# Patient Record
Sex: FEMALE | Race: WHITE | NOT HISPANIC OR LATINO | Employment: OTHER | URBAN - METROPOLITAN AREA
[De-identification: names, ages, dates, MRNs, and addresses within clinical notes are randomized per-mention and may not be internally consistent; named-entity substitution may affect disease eponyms.]

---

## 2018-01-17 ENCOUNTER — HOSPITAL ENCOUNTER (OUTPATIENT)
Facility: HOSPITAL | Age: 83
Setting detail: OBSERVATION
Discharge: NURSING, REHAB OR LONG-TERM CARE | End: 2018-01-19
Attending: EMERGENCY MEDICINE | Admitting: INTERNAL MEDICINE

## 2018-01-17 ENCOUNTER — APPOINTMENT (OUTPATIENT)
Dept: GENERAL RADIOLOGY | Facility: HOSPITAL | Age: 83
End: 2018-01-17

## 2018-01-17 DIAGNOSIS — S80.01XA CONTUSION OF RIGHT KNEE, INITIAL ENCOUNTER: ICD-10-CM

## 2018-01-17 DIAGNOSIS — Z74.09 IMMOBILITY: Primary | ICD-10-CM

## 2018-01-17 DIAGNOSIS — R53.1 GENERALIZED WEAKNESS: ICD-10-CM

## 2018-01-17 DIAGNOSIS — E87.6 HYPOKALEMIA: ICD-10-CM

## 2018-01-17 DIAGNOSIS — Z74.09 IMPAIRED FUNCTIONAL MOBILITY AND ACTIVITY TOLERANCE: ICD-10-CM

## 2018-01-17 LAB
BASOPHILS # BLD AUTO: 0.04 10*3/MM3 (ref 0–0.2)
BASOPHILS NFR BLD AUTO: 0.6 % (ref 0–1.5)
DEPRECATED RDW RBC AUTO: 53.1 FL (ref 37–54)
EOSINOPHIL # BLD AUTO: 0.32 10*3/MM3 (ref 0–0.7)
EOSINOPHIL NFR BLD AUTO: 4.6 % (ref 0.3–6.2)
ERYTHROCYTE [DISTWIDTH] IN BLOOD BY AUTOMATED COUNT: 16.2 % (ref 11.7–13)
HCT VFR BLD AUTO: 35.1 % (ref 35.6–45.5)
HGB BLD-MCNC: 11 G/DL (ref 11.9–15.5)
IMM GRANULOCYTES # BLD: 0 10*3/MM3 (ref 0–0.03)
IMM GRANULOCYTES NFR BLD: 0 % (ref 0–0.5)
LYMPHOCYTES # BLD AUTO: 1.71 10*3/MM3 (ref 0.9–4.8)
LYMPHOCYTES NFR BLD AUTO: 24.5 % (ref 19.6–45.3)
MCH RBC QN AUTO: 28.2 PG (ref 26.9–32)
MCHC RBC AUTO-ENTMCNC: 31.3 G/DL (ref 32.4–36.3)
MCV RBC AUTO: 90 FL (ref 80.5–98.2)
MONOCYTES # BLD AUTO: 1.01 10*3/MM3 (ref 0.2–1.2)
MONOCYTES NFR BLD AUTO: 14.4 % (ref 5–12)
NEUTROPHILS # BLD AUTO: 3.91 10*3/MM3 (ref 1.9–8.1)
NEUTROPHILS NFR BLD AUTO: 55.9 % (ref 42.7–76)
PLATELET # BLD AUTO: 210 10*3/MM3 (ref 140–500)
PMV BLD AUTO: 10.8 FL (ref 6–12)
RBC # BLD AUTO: 3.9 10*6/MM3 (ref 3.9–5.2)
WBC NRBC COR # BLD: 6.99 10*3/MM3 (ref 4.5–10.7)

## 2018-01-17 PROCEDURE — 99285 EMERGENCY DEPT VISIT HI MDM: CPT

## 2018-01-17 PROCEDURE — 81001 URINALYSIS AUTO W/SCOPE: CPT | Performed by: EMERGENCY MEDICINE

## 2018-01-17 PROCEDURE — 85025 COMPLETE CBC W/AUTO DIFF WBC: CPT | Performed by: EMERGENCY MEDICINE

## 2018-01-17 PROCEDURE — 84484 ASSAY OF TROPONIN QUANT: CPT | Performed by: EMERGENCY MEDICINE

## 2018-01-17 PROCEDURE — 72170 X-RAY EXAM OF PELVIS: CPT

## 2018-01-17 PROCEDURE — 84443 ASSAY THYROID STIM HORMONE: CPT | Performed by: HOSPITALIST

## 2018-01-17 PROCEDURE — 73562 X-RAY EXAM OF KNEE 3: CPT

## 2018-01-17 PROCEDURE — 80053 COMPREHEN METABOLIC PANEL: CPT | Performed by: EMERGENCY MEDICINE

## 2018-01-17 RX ORDER — ESCITALOPRAM OXALATE 10 MG/1
TABLET ORAL DAILY
COMMUNITY

## 2018-01-17 RX ORDER — LISINOPRIL 10 MG/1
TABLET ORAL DAILY
COMMUNITY

## 2018-01-17 RX ORDER — AMLODIPINE BESYLATE 10 MG/1
TABLET ORAL DAILY
COMMUNITY

## 2018-01-17 RX ORDER — SODIUM CHLORIDE 0.9 % (FLUSH) 0.9 %
10 SYRINGE (ML) INJECTION AS NEEDED
Status: DISCONTINUED | OUTPATIENT
Start: 2018-01-17 | End: 2018-01-20 | Stop reason: HOSPADM

## 2018-01-18 ENCOUNTER — APPOINTMENT (OUTPATIENT)
Dept: GENERAL RADIOLOGY | Facility: HOSPITAL | Age: 83
End: 2018-01-18

## 2018-01-18 PROBLEM — S22.31XA RIGHT RIB FRACTURE: Status: ACTIVE | Noted: 2018-01-18

## 2018-01-18 PROBLEM — M19.90 ARTHRITIS: Status: ACTIVE | Noted: 2018-01-18

## 2018-01-18 PROBLEM — S80.01XA CONTUSION OF RIGHT KNEE: Status: ACTIVE | Noted: 2018-01-18

## 2018-01-18 PROBLEM — H91.90 DECREASED HEARING: Status: ACTIVE | Noted: 2018-01-18
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
## 2018-01-18 PROBLEM — M41.9 SCOLIOSIS: Status: ACTIVE | Noted: 2018-01-18

## 2018-01-18 PROBLEM — Z74.09 IMMOBILITY: Status: ACTIVE | Noted: 2018-01-18

## 2018-01-18 PROBLEM — I10 HTN (HYPERTENSION): Status: ACTIVE | Noted: 2018-01-18

## 2018-01-18 LAB
ALBUMIN SERPL-MCNC: 3.3 G/DL (ref 3.5–5.2)
ALBUMIN/GLOB SERPL: 0.9 G/DL
ALP SERPL-CCNC: 52 U/L (ref 39–117)
ALT SERPL W P-5'-P-CCNC: 24 U/L (ref 1–33)
ANION GAP SERPL CALCULATED.3IONS-SCNC: 10.1 MMOL/L
ANION GAP SERPL CALCULATED.3IONS-SCNC: 9.9 MMOL/L
AST SERPL-CCNC: 29 U/L (ref 1–32)
BACTERIA UR QL AUTO: ABNORMAL /HPF
BILIRUB SERPL-MCNC: 0.8 MG/DL (ref 0.1–1.2)
BILIRUB UR QL STRIP: NEGATIVE
BUN BLD-MCNC: 12 MG/DL (ref 8–23)
BUN BLD-MCNC: 13 MG/DL (ref 8–23)
BUN/CREAT SERPL: 23.1 (ref 7–25)
BUN/CREAT SERPL: 24.1 (ref 7–25)
CALCIUM SPEC-SCNC: 8.9 MG/DL (ref 8.2–9.6)
CALCIUM SPEC-SCNC: 9.4 MG/DL (ref 8.2–9.6)
CHLORIDE SERPL-SCNC: 102 MMOL/L (ref 98–107)
CHLORIDE SERPL-SCNC: 103 MMOL/L (ref 98–107)
CLARITY UR: CLEAR
CO2 SERPL-SCNC: 27.1 MMOL/L (ref 22–29)
CO2 SERPL-SCNC: 30.9 MMOL/L (ref 22–29)
COLOR UR: YELLOW
CREAT BLD-MCNC: 0.52 MG/DL (ref 0.57–1)
CREAT BLD-MCNC: 0.54 MG/DL (ref 0.57–1)
GFR SERPL CREATININE-BSD FRML MDRD: 106 ML/MIN/1.73
GFR SERPL CREATININE-BSD FRML MDRD: 111 ML/MIN/1.73
GLOBULIN UR ELPH-MCNC: 3.5 GM/DL
GLUCOSE BLD-MCNC: 98 MG/DL (ref 65–99)
GLUCOSE BLD-MCNC: 98 MG/DL (ref 65–99)
GLUCOSE UR STRIP-MCNC: NEGATIVE MG/DL
HGB UR QL STRIP.AUTO: ABNORMAL
HYALINE CASTS UR QL AUTO: ABNORMAL /LPF
KETONES UR QL STRIP: NEGATIVE
LEUKOCYTE ESTERASE UR QL STRIP.AUTO: NEGATIVE
MAGNESIUM SERPL-MCNC: 2 MG/DL (ref 1.6–2.4)
NITRITE UR QL STRIP: NEGATIVE
PH UR STRIP.AUTO: 6.5 [PH] (ref 5–8)
POTASSIUM BLD-SCNC: 3.1 MMOL/L (ref 3.5–5.2)
POTASSIUM BLD-SCNC: 3.8 MMOL/L (ref 3.5–5.2)
PROT SERPL-MCNC: 6.8 G/DL (ref 6–8.5)
PROT UR QL STRIP: NEGATIVE
RBC # UR: ABNORMAL /HPF
REF LAB TEST METHOD: ABNORMAL
SODIUM BLD-SCNC: 140 MMOL/L (ref 136–145)
SODIUM BLD-SCNC: 143 MMOL/L (ref 136–145)
SP GR UR STRIP: 1.01 (ref 1–1.03)
SQUAMOUS #/AREA URNS HPF: ABNORMAL /HPF
TRANS CELLS #/AREA URNS HPF: ABNORMAL /HPF
TROPONIN T SERPL-MCNC: <0.01 NG/ML (ref 0–0.03)
TSH SERPL DL<=0.05 MIU/L-ACNC: 0.95 MIU/ML (ref 0.27–4.2)
UROBILINOGEN UR QL STRIP: ABNORMAL
WBC UR QL AUTO: ABNORMAL /HPF

## 2018-01-18 PROCEDURE — 97162 PT EVAL MOD COMPLEX 30 MIN: CPT

## 2018-01-18 PROCEDURE — G0378 HOSPITAL OBSERVATION PER HR: HCPCS

## 2018-01-18 PROCEDURE — 83735 ASSAY OF MAGNESIUM: CPT | Performed by: HOSPITALIST

## 2018-01-18 PROCEDURE — 36415 COLL VENOUS BLD VENIPUNCTURE: CPT | Performed by: INTERNAL MEDICINE

## 2018-01-18 PROCEDURE — 94799 UNLISTED PULMONARY SVC/PX: CPT

## 2018-01-18 PROCEDURE — 97110 THERAPEUTIC EXERCISES: CPT

## 2018-01-18 PROCEDURE — 25010000002 ENOXAPARIN PER 10 MG: Performed by: HOSPITALIST

## 2018-01-18 PROCEDURE — G8978 MOBILITY CURRENT STATUS: HCPCS

## 2018-01-18 PROCEDURE — G8979 MOBILITY GOAL STATUS: HCPCS

## 2018-01-18 PROCEDURE — 80048 BASIC METABOLIC PNL TOTAL CA: CPT | Performed by: INTERNAL MEDICINE

## 2018-01-18 PROCEDURE — 96372 THER/PROPH/DIAG INJ SC/IM: CPT

## 2018-01-18 PROCEDURE — 71045 X-RAY EXAM CHEST 1 VIEW: CPT

## 2018-01-18 RX ORDER — AMLODIPINE BESYLATE 10 MG/1
10 TABLET ORAL DAILY
Status: DISCONTINUED | OUTPATIENT
Start: 2018-01-18 | End: 2018-01-20 | Stop reason: HOSPADM

## 2018-01-18 RX ORDER — ACETAMINOPHEN 500 MG
1000 TABLET ORAL ONCE
Status: DISCONTINUED | OUTPATIENT
Start: 2018-01-18 | End: 2018-01-18

## 2018-01-18 RX ORDER — ACETAMINOPHEN 325 MG/1
650 TABLET ORAL EVERY 6 HOURS PRN
Status: DISCONTINUED | OUTPATIENT
Start: 2018-01-18 | End: 2018-01-20 | Stop reason: HOSPADM

## 2018-01-18 RX ORDER — TRAMADOL HYDROCHLORIDE 50 MG/1
50 TABLET ORAL EVERY 4 HOURS PRN
Status: DISCONTINUED | OUTPATIENT
Start: 2018-01-18 | End: 2018-01-20 | Stop reason: HOSPADM

## 2018-01-18 RX ORDER — SODIUM CHLORIDE 0.9 % (FLUSH) 0.9 %
1-10 SYRINGE (ML) INJECTION AS NEEDED
Status: DISCONTINUED | OUTPATIENT
Start: 2018-01-18 | End: 2018-01-20 | Stop reason: HOSPADM

## 2018-01-18 RX ORDER — ONDANSETRON 2 MG/ML
4 INJECTION INTRAMUSCULAR; INTRAVENOUS EVERY 6 HOURS PRN
Status: DISCONTINUED | OUTPATIENT
Start: 2018-01-18 | End: 2018-01-20 | Stop reason: HOSPADM

## 2018-01-18 RX ORDER — LISINOPRIL 10 MG/1
10 TABLET ORAL DAILY
Status: DISCONTINUED | OUTPATIENT
Start: 2018-01-18 | End: 2018-01-20 | Stop reason: HOSPADM

## 2018-01-18 RX ORDER — POTASSIUM CHLORIDE 7.45 MG/ML
10 INJECTION INTRAVENOUS
Status: DISCONTINUED | OUTPATIENT
Start: 2018-01-18 | End: 2018-01-20 | Stop reason: HOSPADM

## 2018-01-18 RX ORDER — ACETAMINOPHEN 325 MG/1
650 TABLET ORAL EVERY 4 HOURS PRN
Status: DISCONTINUED | OUTPATIENT
Start: 2018-01-18 | End: 2018-01-20 | Stop reason: HOSPADM

## 2018-01-18 RX ORDER — POTASSIUM CHLORIDE 750 MG/1
40 CAPSULE, EXTENDED RELEASE ORAL AS NEEDED
Status: DISCONTINUED | OUTPATIENT
Start: 2018-01-18 | End: 2018-01-20 | Stop reason: HOSPADM

## 2018-01-18 RX ORDER — ESCITALOPRAM OXALATE 10 MG/1
10 TABLET ORAL DAILY
Status: DISCONTINUED | OUTPATIENT
Start: 2018-01-18 | End: 2018-01-20 | Stop reason: HOSPADM

## 2018-01-18 RX ORDER — POTASSIUM CHLORIDE 1.5 G/1.77G
40 POWDER, FOR SOLUTION ORAL AS NEEDED
Status: DISCONTINUED | OUTPATIENT
Start: 2018-01-18 | End: 2018-01-20 | Stop reason: HOSPADM

## 2018-01-18 RX ORDER — TRAMADOL HYDROCHLORIDE 50 MG/1
100 TABLET ORAL EVERY 4 HOURS PRN
Status: DISCONTINUED | OUTPATIENT
Start: 2018-01-18 | End: 2018-01-20 | Stop reason: HOSPADM

## 2018-01-18 RX ADMIN — POTASSIUM CHLORIDE 40 MEQ: 1.5 POWDER, FOR SOLUTION ORAL at 07:52

## 2018-01-18 RX ADMIN — TRAMADOL HYDROCHLORIDE 50 MG: 50 TABLET, FILM COATED ORAL at 12:42

## 2018-01-18 RX ADMIN — ACETAMINOPHEN 650 MG: 325 TABLET ORAL at 07:23

## 2018-01-18 RX ADMIN — POTASSIUM CHLORIDE 40 MEQ: 750 CAPSULE, EXTENDED RELEASE ORAL at 12:42

## 2018-01-18 RX ADMIN — AMLODIPINE BESYLATE 10 MG: 10 TABLET ORAL at 14:06

## 2018-01-18 RX ADMIN — ESCITALOPRAM 10 MG: 10 TABLET, FILM COATED ORAL at 14:07

## 2018-01-18 RX ADMIN — TRAMADOL HYDROCHLORIDE 50 MG: 50 TABLET, FILM COATED ORAL at 21:29

## 2018-01-18 RX ADMIN — ENOXAPARIN SODIUM 40 MG: 40 INJECTION SUBCUTANEOUS at 07:22

## 2018-01-18 RX ADMIN — LISINOPRIL 10 MG: 10 TABLET ORAL at 14:06

## 2018-01-18 NOTE — PLAN OF CARE
Problem: Patient Care Overview (Adult)  Goal: Plan of Care Review  Outcome: Ongoing (interventions implemented as appropriate)   01/18/18 1415   Coping/Psychosocial Response Interventions   Plan Of Care Reviewed With patient   Outcome Evaluation   Outcome Summary/Follow up Plan pt presents with significant pain limiting all activity. SHe required max a x 2 to sit edge of bed, and was unable to tolerate standing or walking. she will benefit from PT to address, per pt , she was walking independently in her home prior to this injury, I think pt has a good chance to return to this however she needs time to heal and for pain contol, she willbenefit from PT to work on mobility, rec snf at d/c       Problem: Inpatient Physical Therapy  Goal: Bed Mobility Goal LTG- PT  Outcome: Ongoing (interventions implemented as appropriate)   01/18/18 1415   Bed Mobility PT LTG   Bed Mobility PT LTG, Date Established 01/18/18   Bed Mobility PT LTG, Time to Achieve 1 wk   Bed Mobility PT LTG, [place] Level moderate assist (50% patient effort);2 person assist required     Goal: Transfer Training Goal 1 LTG- PT  Outcome: Ongoing (interventions implemented as appropriate)   01/18/18 1415   Transfer Training PT LTG   Transfer Training PT LTG, Date Established 01/18/18   Transfer Training PT LTG, Time to Achieve 1 wk   Transfer Training PT LTG, Activity Type sit to stand/stand to sit   Transfer Training PT LTG, [place] Level moderate assist (50% patient effort);2 person assist required     Goal: Gait Training Goal LTG- PT  Outcome: Ongoing (interventions implemented as appropriate)   01/18/18 1415   Gait Training PT LTG   Gait Training Goal PT LTG, Date Established 01/18/18   Gait Training Goal PT LTG, Time to Achieve 1 wk   Gait Training Goal PT LTG, [place] Level moderate assist (50% patient effort);2 person assist required   Gait Training Goal PT LTG, Assist Device walker, rolling   Gait Training Goal PT LTG, Distance to  Achieve 10 ft

## 2018-01-18 NOTE — PROGRESS NOTES
Discharge Planning Assessment  [facility]     Patient Name: Vale Campos  MRN: [id number]  Today's Date: 1/18/2018    Admit Date: 1/17/2018          Discharge Needs Assessment       01/18/18 1144    Living Environment    Provides Primary Care For no one, unable/limited ability to care for self    Quality Of Family Relationships supportive;helpful;involved    Discharge Needs Assessment    Concerns To Be Addressed home safety concerns;discharge planning concerns    Equipment Currently Used at Home cane, straight;wheelchair;walker, rolling    Equipment Needed After Discharge oxygen;respiratory supplies    Discharge Disposition home or self-care;still a patient;home healthcare service      01/18/18 1123    Living Environment    Lives With child(raquel), adult    Living Arrangements house   lives with her daughter and son in law    Type of Financial/Environmental Concern none            Discharge Plan       01/18/18 1106    Case Management/Social Work Plan    Additional Comments Spoke with Judy Campos/daughter [phone] and she is agreeable for her mother to go to rehab at a [facility], private pay for therapy. She would like for her mother to go to [facility] first choice and [facility] is her 2nd choice. Placed call to [facility] and she will evaluate and let CCP know if they have a bed today and if they can accept. CCP will continue to follow for any needs that may arise. JAVIER Franco RN, CCP.         Discharge Placement     Facility/Agency Request Status Selected? Address Phone Number Fax Number    [facility] Pending - Request Sent     [address] [address] [phone]    [facility] Pending - Request Sent     [address] [address] [phone]                Demographic Summary       01/18/18 1116    Referral Information    Admission Type observation    Arrived From home or self-care    Referral Source admission  list;physician    Reason For Consult discharge planning    Record Reviewed clinical discipline documentation;history and physical;medical record;patient profile;plan of care    Contact Information    Permission Granted to Share Information With             Functional Status     None            Psychosocial     None            Abuse/Neglect     None            Legal     None            Substance Abuse     None            Patient Forms     None          Ila Franco RN

## 2018-01-18 NOTE — PROGRESS NOTES
Discharge Planning Assessment  [facility]     Patient Name: Vale Campos  MRN: [id number]  Today's Date: 1/18/2018    Admit Date: 1/17/2018          Discharge Needs Assessment     None            Discharge Plan       01/18/18 1106    Case Management/Social Work Plan    Additional Comments Spoke with Judy Campos/daughter [phone] and she is agreeable for her mother to go to rehab at a [facility], private pay for therapy. She would like for her mother to go to [facility] first choice and [facility] is her 2nd choice. Placed call to [facility] and she will evaluate and let CCP know if they have a bed today and if they can accept. CCP will continue to follow for any needs that may arise. JAVIER Franco RN, [facility].         Discharge Placement     Facility/Agency Request Status Selected? Address Phone Number Fax Number    [facility] Pending - Request Sent     [address] [address] [phone]    [facility] Pending - Request Sent     [address] [address] [phone]                Demographic Summary     None            Functional Status     None            Psychosocial     None            Abuse/Neglect     None            Legal     None            Substance Abuse     None            Patient Forms     None          Ila Franco RN

## 2018-01-18 NOTE — PROGRESS NOTES
Discharge Planning Assessment  [facility]     Patient Name: Vale Campos  MRN: [id number]  Today's Date: 1/18/2018    Admit Date: 1/17/2018          Discharge Needs Assessment     None            Discharge Plan       01/18/18 1709    Case Management/Social Work Plan    Plan SNU for short term rehab private pay for therapy. Family will make the decison on which facility tomorrow. JAVIER Franco RN, CCP.     Patient/Family In Agreement With Plan yes    Additional Comments Spoke with Judy/daughter and she states that if her mother will go to rehab then they will make sure the financial piece is covered. Discharged plan is for tomorrow. [facility] does not have a bed available tomorrow. The family will review the list and call CCP in the AM of their choices. of SNU. JAVIER Franco RN, CCP.         Discharge Placement     Facility/Agency Request Status Selected? Address Phone Number Fax Number    [facility] Declined   no beds available     [address] [address] [phone]    [facility] Declined   no beds available     [address] [address] [phone]                Demographic Summary     None            Functional Status       01/18/18 1702    Functional Status Current    Ambulation 3-->assistive equipment and person    Transferring 3-->assistive equipment and person    Toileting 3-->assistive equipment and person    Bathing 3-->assistive equipment and person    Dressing 3-->assistive equipment and person    Eating 3-->assistive equipment and person    Communication 0-->understands/communicates without difficulty    Swallowing (if score 2 or more for any item, consult Rehab Services) 0-->swallows foods/liquids without difficulty    Change in Functional Status Since Onset of Current Illness/Injury yes    Functional Status Prior    Ambulation 2-->assistive person    Transferring 2-->assistive person     Toileting 2-->assistive person    Bathing 2-->assistive person    Dressing 2-->assistive person    Eating 2-->assistive person    Communication 0-->understands/communicates without difficulty    Swallowing 0-->swallows foods/liquids without difficulty    IADL    Medications completely dependent    Meal Preparation completely dependent    Housekeeping completely dependent    Laundry completely dependent    Shopping completely dependent    Oral Care independent    Activity Tolerance    Current Activity Limitations --   pain with any movement    Usual Activity Tolerance fair    Current Activity Tolerance poor    Cognitive/Perceptual/Developmental    Current Mental Status/Cognitive Functioning no deficits noted    Recent Changes in Mental Status/Cognitive Functioning no changes    Developmental Stage (Eriksson's Stages of Development) Stage 8 (65 years-death/Late Adulthood) Integrity vs. 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

## 2018-01-18 NOTE — ED NOTES
Fell sun and was seen at [facility].  Diagnosed with contusions and discharged.  Now complains of severe pain in right knee and unable to get up out of bed.  Lives with son in law and he is unable to get her up OOB.       Javon Siddiqui RN  01/17/18 1948

## 2018-01-18 NOTE — ED PROVIDER NOTES
EMERGENCY DEPARTMENT ENCOUNTER    CHIEF COMPLAINT  Chief Complaint: Fall  History given by: patient   History limited by: n/a  Room Number: 18/18  PMD: Juanita Carvalho MD      HPI:  Pt is a 89 y.o. female who presents after a fall 3 days ago. Pt was seen at [facility] after the fall, and was dx with a right rib fracture. Currently, pt complains of pain in her right hip pain. Pt states that she has difficulty ambulating and getting out of bed because of the fall.     Duration:  3 days   Onset: gradual  Timing: constant   Location: right hip   Radiation: none   Quality: pain   Intensity/Severity: moderate  Progression: unchanged   Associated Symptoms: none  Aggravating Factors: movement  Alleviating Factors: none    PAST MEDICAL HISTORY  Active Ambulatory Problems     Diagnosis Date Noted   • No Active Ambulatory Problems     Resolved Ambulatory Problems     Diagnosis Date Noted   • No Resolved Ambulatory Problems     Past Medical History:   Diagnosis Date   • Arthritis    • Hypertension        PAST SURGICAL HISTORY  Past Surgical History:   Procedure Laterality Date   • APPENDECTOMY     • CHOLECYSTECTOMY         FAMILY HISTORY  History reviewed. No pertinent family history.    SOCIAL HISTORY  Social History     Social History   • Marital status:      Spouse name: N/A   • Number of children: N/A   • Years of education: N/A     Occupational History   • Not on file.     Social History Main Topics   • Smoking status: Former Smoker   • Smokeless tobacco: Never Used   • Alcohol use No   • Drug use: No   • Sexual activity: Defer     Other Topics Concern   • Not on file     Social History Narrative   • No narrative on file       ALLERGIES  Codeine    REVIEW OF SYSTEMS  Review of Systems   Constitutional: Negative for chills and fever.   HENT: Negative for sore throat.    Respiratory: Negative for cough.    Gastrointestinal: Negative for nausea and vomiting.   Genitourinary: Negative for dysuria.   Musculoskeletal:  Positive for arthralgias (Right hip). Negative for back pain.   Psychiatric/Behavioral: The patient is not nervous/anxious.        PHYSICAL EXAM  ED Triage Vitals   Temp Heart Rate Resp BP SpO2   01/17/18 1948 01/17/18 1948 01/17/18 1948 01/17/18 1948 01/17/18 1948   98.6 °F (37 °C) 72 14 175/85 96 %      Temp src Heart Rate Source Patient Position BP Location FiO2 (%)   -- -- -- -- --              Physical Exam   Constitutional: She is oriented to person, place, and time and well-developed, well-nourished, and in no distress. No distress.   HENT:   Head: Normocephalic and atraumatic.   Eyes: EOM are normal. Pupils are equal, round, and reactive to light.   Neck: Normal range of motion. Neck supple.   Cardiovascular: Normal rate, regular rhythm and normal heart sounds.    Pulmonary/Chest: Effort normal and breath sounds normal. No respiratory distress.   Abdominal: Soft. There is no tenderness. There is no rebound and no guarding.   Musculoskeletal: Normal range of motion. She exhibits no edema.   Contusion to the right knee. Full ROM   Neurological: She is alert and oriented to person, place, and time. She has normal sensation and normal strength.   Skin: Skin is warm and dry. No rash noted.   Psychiatric: Mood and affect normal.   Nursing note and vitals reviewed.      LAB RESULTS  Lab Results (last 24 hours)     Procedure Component Value Units Date/Time    CBC & Differential [[id number]] Collected:  01/17/18 2342    Specimen:  Blood Updated:  01/17/18 4557    Narrative:       The following orders were created for panel order CBC & Differential.  Procedure                               Abnormality         Status                     ---------                               -----------         ------                     CBC Auto Differential[[id number]]        Abnormal            Final result                 Please view results for these tests on the individual orders.    Comprehensive Metabolic Panel [[id number]]   (Abnormal) Collected:  01/17/18 2342    Specimen:  Blood Updated:  01/18/18 0019     Glucose 98 mg/dL      BUN 13 mg/dL      Creatinine 0.54 (L) mg/dL      Sodium 143 mmol/L      Potassium 3.1 (L) mmol/L      Chloride 102 mmol/L      CO2 30.9 (H) mmol/L      Calcium 9.4 mg/dL      Total Protein 6.8 g/dL      Albumin 3.30 (L) g/dL      ALT (SGPT) 24 U/L      AST (SGOT) 29 U/L      Alkaline Phosphatase 52 U/L      Total Bilirubin 0.8 mg/dL      eGFR Non African Amer 106 mL/min/1.73      Globulin 3.5 gm/dL      A/G Ratio 0.9 g/dL      BUN/Creatinine Ratio 24.1     Anion Gap 10.1 mmol/L     Narrative:       The MDRD GFR formula is only valid for adults with stable renal function between ages 18 and 70.    Troponin [[id number]]  (Normal) Collected:  01/17/18 2342    Specimen:  Blood Updated:  01/18/18 0019     Troponin T <0.010 ng/mL     Narrative:       Troponin T Reference Ranges:  Less than 0.03 ng/mL:    Negative for AMI  0.03 to 0.09 ng/mL:      Indeterminant for AMI  Greater than 0.09 ng/mL: Positive for AMI    CBC Auto Differential [[id number]]  (Abnormal) Collected:  01/17/18 2342    Specimen:  Blood Updated:  01/17/18 2357     WBC 6.99 10*3/mm3      RBC 3.90 10*6/mm3      Hemoglobin 11.0 (L) g/dL      Hematocrit 35.1 (L) %      MCV 90.0 fL      MCH 28.2 pg      MCHC 31.3 (L) g/dL      RDW 16.2 (H) %      RDW-SD 53.1 fl      MPV 10.8 fL      Platelets 210 10*3/mm3      Neutrophil % 55.9 %      Lymphocyte % 24.5 %      Monocyte % 14.4 (H) %      Eosinophil % 4.6 %      Basophil % 0.6 %      Immature Grans % 0.0 %      Neutrophils, Absolute 3.91 10*3/mm3      Lymphocytes, Absolute 1.71 10*3/mm3      Monocytes, Absolute 1.01 10*3/mm3      Eosinophils, Absolute 0.32 10*3/mm3      Basophils, Absolute 0.04 10*3/mm3      Immature Grans, Absolute 0.00 10*3/mm3     Urinalysis With / Culture If Indicated - Urine, Catheter [[id number]]  (Abnormal) Collected:  01/17/18 2265    Specimen:  Urine from Urine, Catheter Updated:   01/18/18 0046     Color, UA Yellow     Appearance, UA Clear     pH, UA 6.5     Specific Gravity, UA 1.007     Glucose, UA Negative     Ketones, UA Negative     Bilirubin, UA Negative     Blood, UA Moderate (2+) (A)     Protein, UA Negative     Leuk Esterase, UA Negative     Nitrite, UA Negative     Urobilinogen, UA 1.0 E.U./dL    Urinalysis, Microscopic Only - Urine, Clean Catch [[id number]]  (Abnormal) Collected:  01/17/18 2354    Specimen:  Urine from Urine, Catheter Updated:  01/18/18 0103     RBC, UA 6-12 (A) /HPF      WBC, UA 3-5 (A) /HPF      Bacteria, UA None Seen /HPF      Squamous Epithelial Cells, UA 3-6 (A) /HPF      Transitional Epithelial Cells, UA 0-2 /HPF      Hyaline Casts, UA None Seen /LPF      Methodology Manual Light Microscopy          I ordered the above labs and reviewed the results    RADIOLOGY  XR Pelvis 1 or 2 View   Final Result   No acute finding       This report was finalized on 1/17/2018 11:48 PM by Steve Brewer MD.          XR Knee 3 View Right    (Results Pending)        I ordered the above noted radiological studies. Interpreted by radiologist. Reviewed by me in PACS.       PROCEDURES  Procedures      PROGRESS AND CONSULTS  ED Course     23;19  Troponin, UA, CMP, and CBC ordered.     23:20  BP- 170/82 HR- 63 Temp- 97.9 °F (36.6 °C) O2 sat- 97%  Advised pt of the plan for XR pelvis. Pt understands and agrees with the plan, all questions answered.    23:27  XR pelvis ordered.    01:20  BP- 173/82 HR- 62 Temp- 97.9 °F (36.6 °C) O2 sat- 94%  Rechecked the patient who is in NAD and is resting comfortably. Advised pt of the plan for admission. Pt understands and agrees with the plan, all questions answered.    01:34  Call placed to [facility] for admission.     01:44  Discussed pt's case with Dr Schofield ([facility]), who agrees to admit the pt to observation.     MEDICAL DECISION MAKING  Results were reviewed/discussed with the patient and they were also made aware of online access. Pt also made aware  that some labs, such as cultures, will not be resulted during ER visit and follow up with PMD is necessary.     MDM  Number of Diagnoses or Management Options  Contusion of right knee, initial encounter:   Generalized weakness:   Hypokalemia:   Immobility:      Amount and/or Complexity of Data Reviewed  Clinical lab tests: ordered and reviewed (K=3.1)  Tests in the radiology section of CPT®: ordered and reviewed (XR right knee and right hip shows NAD)  Decide to obtain previous medical records or to obtain history from someone other than the patient: yes  Review and summarize past medical records: yes (Seen at [facility] on 1/14/18. XR shows probable right 12th rib fracture. CT head was negative at that time. )  Discuss the patient with other providers: yes (Dr Schofield, [facility])    Patient Progress  Patient progress: stable         DIAGNOSIS  Final diagnoses:   Immobility   Generalized weakness   Hypokalemia   Contusion of right knee, initial encounter       DISPOSITION  ADMISSION    Discussed treatment plan and reason for admission with pt/family and admitting physician.  Pt/family voiced understanding of the plan for admission for further testing/treatment as needed.       Latest Documented Vital Signs:  As of 3:53 AM  BP- (!) 195/87 HR- 60 Temp- 97.9 °F (36.6 °C) O2 sat- 92%    --  Documentation assistance provided by ashley Olivera for Dr Gray.  Information recorded by the ashley was done at my direction and has been verified and validated by me.        Marian Olivera  01/18/18 0146       Darren Gray MD  01/18/18 0353

## 2018-01-18 NOTE — H&P
Internal medicine history and physical    INTERNAL MEDICINE   [facility]       Patient Identification:  Name: Vale Campos  Age: 89 y.o.  Sex: female  :  1927  MRN: [id number]                   Primary Care Physician: Juanita Carvalho MD                                   Chief Complaint:  Pain in the right chest and immobility because of that after a recent fall.    History of Present Illness:   Patient is a 89-year-old female who apparently had a fall at home resulting in visit to the emergency room 4 days ago at [facility] her workup did reveal that she has a right-sided rib fracture which was closed nondisplaced.  Patient will also complaining of right hip pain.  All of her x-rays at the besides the right rib fractures did not show any fracture at the facility..  Evidently patient has a difficulty in ambulating because of the pain.  She was also weak and was having hard time getting out of her bed.  Because of these she was brought to the emergency room  She denies any fever or denies any chills but states that every time she days her arm or move her right side of the chest hurts pretty bad.  She is also complaining of pain in her right hip but she couldn't move it and there is no pain and tenderness upon passive movement.    Past Medical History:  Past Medical History:   Diagnosis Date   • Arthritis    • Depression    • Hypertension    • Prolapse of uterus    • Scoliosis      Past Surgical History:  Past Surgical History:   Procedure Laterality Date   • APPENDECTOMY     • CHOLECYSTECTOMY        Home Meds:  Prescriptions Prior to Admission   Medication Sig Dispense Refill Last Dose   • amLODIPine (NORVASC) 10 MG tablet Take  by mouth Daily.      • escitalopram (LEXAPRO) 10 MG tablet Take  by mouth Daily.      • lisinopril (PRINIVIL,ZESTRIL) 10 MG tablet Take  by mouth Daily.        Current Meds:     Current Facility-Administered Medications:   •  acetaminophen (TYLENOL) tablet  650 mg, 650 mg, Oral, Q6H PRN, Jason Schofield MD  •  acetaminophen (TYLENOL) tablet 650 mg, 650 mg, Oral, Q4H PRN, Monica Hernandez MD, 650 mg at 01/18/18 0723  •  amLODIPine (NORVASC) tablet 10 mg, 10 mg, Oral, Daily, Jason Schofield MD  •  enoxaparin (LOVENOX) syringe 40 mg, 40 mg, Subcutaneous, Q24H, Jason Schofield MD, 40 mg at 01/18/18 0722  •  escitalopram (LEXAPRO) tablet 10 mg, 10 mg, Oral, Daily, Jason Schofield MD  •  lisinopril (PRINIVIL,ZESTRIL) tablet 10 mg, 10 mg, Oral, Daily, Jason Schofield MD  •  ondansetron (ZOFRAN) injection 4 mg, 4 mg, Intravenous, Q6H PRN, Jason Schofield MD  •  potassium chloride (MICRO-K) CR capsule 40 mEq, 40 mEq, Oral, PRN, 40 mEq at 01/18/18 1242 **OR** potassium chloride (KLOR-CON) packet 40 mEq, 40 mEq, Oral, PRN, 40 mEq at 01/18/18 0752 **OR** potassium chloride 10 mEq in 100 mL IVPB, 10 mEq, Intravenous, Q1H PRN, Jason Schofield MD  •  sodium chloride 0.9 % flush 1-10 mL, 1-10 mL, Intravenous, PRN, Jason Schofield MD  •  Insert peripheral IV, , , Once **AND** sodium chloride 0.9 % flush 10 mL, 10 mL, Intravenous, PRN, Darren Gray MD  •  traMADol (ULTRAM) tablet 50 mg, 50 mg, Oral, Q4H PRN, 50 mg at 01/18/18 1242 **OR** traMADol (ULTRAM) tablet 100 mg, 100 mg, Oral, Q4H PRN, Monica Hernandez MD  Allergies:  Allergies   Allergen Reactions   • Codeine      Social History:   Social History   Substance Use Topics   • Smoking status: Former Smoker   • Smokeless tobacco: Never Used   • Alcohol use No      Family History:  History reviewed. No pertinent family history.       Review of Systems  See history of present illness and past medical history.   Constitutional: Negative for chills and fever.   HENT: Negative for sore throat.    Respiratory: Negative for cough.    Gastrointestinal: Negative for nausea and vomiting.   Genitourinary: Negative for dysuria.   Musculoskeletal: Positive for arthralgias (Right hip). Negative for back pain.   Psychiatric/Behavioral: The patient  "is not nervous/anxious.      Vitals:   /76 (BP Location: Left arm)  Pulse 65  Temp 97.8 °F (36.6 °C)  Resp 12  Ht 152.4 cm (60\")  Wt 56.7 kg (125 lb)  SpO2 92%  BMI 24.41 kg/m2  I/O:   Intake/Output Summary (Last 24 hours) at 01/18/18 1343  Last data filed at 01/18/18 0800   Gross per 24 hour   Intake              120 ml   Output              250 ml   Net             -130 ml     Exam:  General Appearance:    Alert, cooperative, no distress, appears stated ageAnd chronically ill but does not appear to be toxic or septic.  Hard of hearing and alert and oriented ×3.     Head:    Normocephalic, without obvious abnormality, atraumatic   Eyes:    PERRL, conjunctiva/corneas clear, EOM's intact, both eyes   Ears:    Normal external ear canals, both ears   Nose:   Nares normal, septum midline, mucosa normal, no drainage    or sinus tenderness   Throat:   Lips, tongue, gums normal; oral mucosa pink and moist   Neck:   Supple, symmetrical, trachea midline, no adenopathy;     thyroid:  no enlargement/tenderness/nodules; no carotid    bruit or JVD   Back:     Symmetric, no curvature, ROM normal, no CVA tenderness   Lungs:     Clear to auscultation bilaterally, respirations unlabored   Chest Wall:    Tenderness and an contusion on the right side the chest wall noted     Heart:    Regular rate and rhythm, S1 and S2 normal, no murmur, rub   or gallop   Abdomen:     Soft, non-tender, bowel sounds active all four quadrants,     no masses, no hepatomegaly, no splenomegaly   Extremities:   Contusion on the right knee noted no joint line tenderness noted    Pulses:   Pulses palpable in all extremities; symmetric all extremities   Skin:   Skin color normal, Skin is warm and dry,  no rashes or palpable lesions   Neurologic:   CNII-XII intact, motor strength grossly intact, sensation grossly intact to light touch, no focal deficits noted       Data Review:      I reviewed the patient's new clinical results.    Results from " last 7 days  Lab Units 01/17/18  2342   WBC 10*3/mm3 6.99   HEMOGLOBIN g/dL 11.0*   PLATELETS 10*3/mm3 210       Results from last 7 days  Lab Units 01/18/18  1231 01/17/18  2342   SODIUM mmol/L 140 143   POTASSIUM mmol/L 3.8 3.1*   CHLORIDE mmol/L 103 102   CO2 mmol/L 27.1 30.9*   BUN mg/dL 12 13   CREATININE mg/dL 0.52* 0.54*   CALCIUM mg/dL 8.9 9.4   GLUCOSE mg/dL 98 98       Assessment:  Active Hospital Problems (** Indicates Principal Problem)    Diagnosis Date Noted   • **Immobility [Z74.09] 01/18/2018   • Right rib fracture [S22.31XA] 01/18/2018   • Fall [W19.XXXA] 01/18/2018   • Contusion of right knee [S80.01XA] 01/18/2018   • HTN (hypertension) [I10] 01/18/2018   • Scoliosis [M41.9] 01/18/2018   • Arthritis [M19.90] 01/18/2018   • Decreased hearing [H91.90] 01/18/2018      Resolved Hospital Problems    Diagnosis Date Noted Date Resolved   No resolved problems to display.      EMERGENCY PORTABLE CHEST SINGLE VIEW     HISTORY: 89-year-old female with a known reading right rib fracture     COMPARISON: None available     FINDINGS:  1. Vascular calcification mild cardiac enlargement.  2. Small pleural effusions.  3. Prominent degenerative change of the shoulders.  4. Limited imaging due to body habitus and portal technique.  5. Recommend follow-up PA and lateral imaging.  Narrative:          Pelvis     CLINICAL HISTORY: Trauma.     FINDINGS: Single AP view of the pelvis obtained. No acute fracture or  dislocation. SI joints are well aligned. There is generalized  osteopenia. Soft tissues are unremarkable.          Impression:         No acute finding      Order Status: Completed Collected: 01/17/18 2128         Updated: 01/18/18 0951       Narrative:         THREE-VIEW RIGHT KNEE     HISTORY: right knee injury     FINDINGS: 3 views of the right knee were obtained. There are old  appearing deformities of the lateral femoral condyle peripherally and  the lateral tibial plateau. No acute fracture or dislocation  is  appreciated.  There are tricompartment arthritic changes, greater in the  lateral knee compartment with spurring and sclerosis. Generalized  osteopenia. No significant effusion. There is soft tissue swelling  anterior to the proximal tibia and tibial plateau.          Impression:         1. No acute osseous abnormality.          Plan:  Fall with contusion on the right chest with right rib fracture nondisplaced - continue with pain control incentive spirometry cough pillow and physical therapy.  outpatient or inpatient acute or subacute rehabilitation .  We'll get CCP evaluation.  Right knee contusion continue with pain medication and physical therapy  Recurrent falls need safer environment physical therapy and adjust her living situation based on her declining functional capacity-fall precautions.  Hypertension continue her home medications  Hypokalemia electrolyte imbalance replace electrolytes.      Irineo Solano MD   1/18/2018  1:43 PM  Much of this encounter note is an electronic transcription/translation of spoken language to printed text. The electronic translation of spoken language may permit erroneous, or at times, nonsensical words or phrases to be inadvertently transcribed; Although I have reviewed the note for such errors, some may still exist

## 2018-01-18 NOTE — PLAN OF CARE
Problem: Patient Care Overview (Adult)  Goal: Plan of Care Review  Outcome: Ongoing (interventions implemented as appropriate)   01/18/18 1415 01/18/18 1707   Coping/Psychosocial Response Interventions   Plan Of Care Reviewed With patient --    Outcome Evaluation   Outcome Summary/Follow up Plan --  Patient admitted to [facility]. Medicated x1 for pain. Patient using bed pan to void. PT worked with patient today for limited mobility. No c/o of nausea. Cont. to monitor and follow current orders.   Patient Care Overview   Progress --  no change     Goal: Adult Individualization and Mutuality  Outcome: Ongoing (interventions implemented as appropriate)    Goal: Discharge Needs Assessment  Outcome: Ongoing (interventions implemented as appropriate)      Problem: Fall Risk (Adult)  Goal: Identify Related Risk Factors and Signs and Symptoms  Outcome: Outcome(s) achieved Date Met: 01/18/18    Goal: Absence of Falls  Outcome: Ongoing (interventions implemented as appropriate)      Problem: Pain, Acute (Adult)  Goal: Identify Related Risk Factors and Signs and Symptoms  Outcome: Outcome(s) achieved Date Met: 01/18/18    Goal: Acceptable Pain Control/Comfort Level  Outcome: Ongoing (interventions implemented as appropriate)      Problem: Pressure Ulcer Risk (Parish Scale) (Adult,Obstetrics,Pediatric)  Goal: Identify Related Risk Factors and Signs and Symptoms  Outcome: Outcome(s) achieved Date Met: 01/18/18    Goal: Skin Integrity  Outcome: Ongoing (interventions implemented as appropriate)

## 2018-01-18 NOTE — THERAPY EVALUATION
Acute Care - Physical Therapy Initial Evaluation  [facility]     Patient Name: Vale Campos  : 1927  MRN: [id number]  Today's Date: 2018   Onset of Illness/Injury or Date of Surgery Date: 18  Date of Referral to PT: 18  Referring Physician: Kanwal      Admit Date: 2018     Visit Dx:    ICD-10-CM ICD-9-CM   1. Immobility Z74.09 799.89   2. Generalized weakness R53.1 780.79   3. Hypokalemia E87.6 276.8   4. Contusion of right knee, initial encounter S80.01XA 924.11   5. Impaired functional mobility and activity tolerance Z74.09 V49.89     Patient Active Problem List   Diagnosis   • Immobility   • Right rib fracture   • Fall   • Contusion of right knee   • HTN (hypertension)   • Scoliosis   • Arthritis   • Decreased hearing     Past Medical History:   Diagnosis Date   • Arthritis    • Depression    • Hypertension    • Prolapse of uterus    • Scoliosis      Past Surgical History:   Procedure Laterality Date   • APPENDECTOMY     • CHOLECYSTECTOMY            PT ASSESSMENT (last 72 hours)      PT Evaluation       18 1407 18 1144    Rehab Evaluation    Document Type evaluation  -PC     Subjective Information complains of;pain  -PC     Patient Effort, Rehab Treatment adequate  -PC     Symptoms Noted During/After Treatment increased pain  -PC     General Information    Patient Profile Review yes  -PC     Onset of Illness/Injury or Date of Surgery Date 18  -PC     Referring Physician Kanwal  -CHRISTIANE     General Observations pt is in bed, at rest is comfortable, but cries out in pain with any mvmt  -PC     Pertinent History Of Current Problem fall, R rib fx, R knee contusion  -PC     Precautions/Limitations fall precautions  -PC     Prior Level of Function independent:;all household mobility  -PC     Equipment Currently Used at Home cane, straight;walker, rolling  -PC cane, straight;wheelchair;walker, rolling  -BC    Plans/Goals Discussed With patient  -PC     Barriers to Rehab  medically complex  -PC     Clinical Impression    Date of Referral to PT 01/18/18  -PC     Criteria for Skilled Therapeutic Interventions Met yes;treatment indicated  -PC     Pathology/Pathophysiology Noted (Describe Specifically for Each System) musculoskeletal  -PC     Impairments Found (describe specific impairments) gait, locomotion, and balance  -PC     Rehab Potential good, to achieve stated therapy goals  -PC     Pain Assessment    Pain Assessment Laurent-Camejo FACES  -PC     Laurent-Camejo FACES Pain Rating 8  -PC     Pain Type Acute pain  -PC     Pain Location Rib cage  -PC     Pain Orientation Right  -PC     Pain Intervention(s) Medication (See MAR);Repositioned  -PC     Response to Interventions not tolerated  -PC     Cognitive Assessment/Intervention    Current Cognitive/Communication Assessment impaired  -PC     Orientation Status oriented to;person  -PC     Follows Commands/Answers Questions able to follow single-step instructions;75% of the time;needs cueing;needs increased time;needs repetition  -PC     Personal Safety decreased insight to deficits  -PC     Personal Safety Interventions fall prevention program maintained;gait belt  -PC     ROM (Range of Motion)    General ROM Detail dec ROM L shoulder due to it causing inc pain , dec R shoulder and elbow ROM due to R elbow deformity from previous fx, B LE WFL, pt has a significant fixed thoracic kyphoscoliosis  -PC     MMT (Manual Muscle Testing)    General MMT Assessment Detail BLE 3+/5, RUE 2/5, LUE 3-/5  -PC     Bed Mobility, Assessment/Treatment    Bed Mob, Supine to Sit, [place] moderate assist (50% patient effort);maximum assist (25% patient effort);2 person assist required  -PC     Bed Mob, Sit to Supine, [place] maximum assist (25% patient effort);2 person assist required  -PC     Bed Mobility, Comment pt crying/screaming out with pian with mvmt  -PC     Transfer Assessment/Treatment    Transfer, Comment an attempt was made to stand  however pt unable to tolerate due to significant inc in pain, pt screaming out with pain  -PC     Positioning and Restraints    Pre-Treatment Position in bed  -PC     Post Treatment Position bed  -PC     In Bed supine;call light within reach;encouraged to call for assist;exit alarm on  -PC       01/18/18 1123 01/18/18 0802    General Information    Equipment Currently Used at Home  cane, straight;wheelchair;walker, rolling  -MICHAEL    Living Environment    Lives With child(raquel), adult  -BC child(raquel), adult  -MICHAEL    Living Arrangements house   lives with her daughter and son in law  -BC house  -MICHAEL    Home Accessibility  no concerns  -MICHAEL    Stair Railings at Home  none  -MICHAEL    Type of Financial/Environmental Concern none  -BC none  -MICHAEL    Transportation Available  car;family or friend will provide  -MICHAEL    Muscle Tone Assessment    Muscle Tone Assessment  Bilateral Upper Extremities;Bilateral Lower Extremities  -MICHAEL    Bilateral Upper Extremities Muscle Tone Assessment  mildly decreased tone  -MICHAEL    Bilateral Lower Extremities Muscle Tone Assessment  mildly decreased tone  -MICHAEL      User Key  (r) = Recorded By, (t) = Taken By, (c) = Cosigned By    Initials Name Provider Type    PC Rand Cabrera, PT Physical Therapist    BC Ila Franco, RN Case Manager    MICHAEL Marge Gamez, RN Registered Nurse          Physical Therapy Education     Title: PT OT SLP Therapies (Active)     Topic: Physical Therapy (Active)     Point: Mobility training (Active)    Learning Progress Summary    Learner Readiness Method Response Comment Documented by Status   Patient Acceptance E,D NR   01/18/18 1415 Active               Point: Home exercise program (Active)    Learning Progress Summary    Learner Readiness Method Response Comment Documented by Status   Patient Acceptance E,D NR   01/18/18 1415 Active               Point: Body mechanics (Active)    Learning Progress Summary    Learner Readiness Method Response Comment Documented by  Status   Patient Acceptance ARIANA MOSELEY NR   01/18/18 1415 Active               Point: Precautions (Active)    Learning Progress Summary    Learner Readiness Method Response Comment Documented by Status   Patient Acceptance ARIANA MOSELEY NR   01/18/18 1415 Active                      User Key     Initials Effective Dates Name Provider Type Discipline     12/01/15 -  Rand Cabrera, PT Physical Therapist PT                PT Recommendation and Plan  Anticipated Discharge Disposition: skilled nursing facility  Planned Therapy Interventions: bed mobility training, gait training, home exercise program, transfer training, strengthening  PT Frequency: daily  Plan of Care Review  Plan Of Care Reviewed With: patient  Outcome Summary/Follow up Plan: pt presents with significant pain limiting all activity.  SHe required max a x 2 to sit edge of bed, and was unable to tolerate standing or walking.  she will benefit from PT to address, per pt , she was walking independently in her home prior to this injury, I think pt has a good chance to return to this however she needs time to heal and for pain contol, she willbenefit from PT to work on mobility, rec snf at d/c          IP PT Goals       01/18/18 1415          Bed Mobility PT LTG    Bed Mobility PT LTG, Date Established 01/18/18  -PC      Bed Mobility PT LTG, Time to Achieve 1 wk  -PC      Bed Mobility PT LTG, [place] Level moderate assist (50% patient effort);2 person assist required  -PC      Transfer Training PT LTG    Transfer Training PT LTG, Date Established 01/18/18  -PC      Transfer Training PT LTG, Time to Achieve 1 wk  -PC      Transfer Training PT LTG, Activity Type sit to stand/stand to sit  -PC      Transfer Training PT LTG, [place] Level moderate assist (50% patient effort);2 person assist required  -PC      Gait Training PT LTG    Gait Training Goal PT LTG, Date Established 01/18/18  -PC      Gait Training Goal PT LTG, Time to Achieve 1 wk  -PC      Gait  Training Goal PT LTG, [place] Level moderate assist (50% patient effort);2 person assist required  -PC      Gait Training Goal PT LTG, Assist Device walker, rolling  -PC      Gait Training Goal PT LTG, Distance to Achieve 10 ft  -PC        User Key  (r) = Recorded By, (t) = Taken By, (c) = Cosigned By    Initials Name Provider Type    PC Rand Cabrera PT Physical Therapist                Outcome Measures       01/18/18 1400          How much help from another person do you currently need...    Turning from your back to your side while in flat bed without using bedrails? 2  -PC      Moving from lying on back to sitting on the side of a flat bed without bedrails? 2  -PC      Moving to and from a bed to a chair (including a wheelchair)? 1  -PC      Standing up from a chair using your arms (e.g., wheelchair, bedside chair)? 1  -PC      Climbing 3-5 steps with a railing? 1  -PC      To walk in hospital room? 1  -PC      AM-PAC 6 Clicks Score 8  -PC      Functional Assessment    Outcome Measure Options AM-PAC 6 Clicks Basic Mobility (PT)  -PC        User Key  (r) = Recorded By, (t) = Taken By, (c) = Cosigned By    Initials Name Provider Type    PC Rand Cabrera PT Physical Therapist           Time Calculation:         PT Charges       01/18/18 1422          Time Calculation    Start Time 1339  -PC      Stop Time 1359  -PC      Time Calculation (min) 20 min  -PC      PT Received On 01/18/18  -PC      PT - Next Appointment 01/19/18  -PC      PT Goal Re-Cert Due Date 01/25/18  -PC        User Key  (r) = Recorded By, (t) = Taken By, (c) = Cosigned By    Initials Name Provider Type    PC Rand Cabrera PT Physical Therapist          Therapy Charges for Today     Code Description Service Date Service Provider Modifiers Qty    41597210256 HC PT EVAL MOD COMPLEXITY 2 1/18/2018 Rand Cabrera, PT GP 1    70870966989 HC PT THER PROC EA 15 MIN 1/18/2018 Rand Cabrera, PT GP 1    05686011124 HC PT THER SUPP EA 15 MIN  1/18/2018 Rand Cabrera, PT GP 1          PT G-Codes  Outcome Measure Options: AM-PAC 6 Clicks Basic Mobility (PT)      Rand Cabrera, PT  1/18/2018

## 2018-01-18 NOTE — ED NOTES
"Patients daughter called this RN to voice concerns about her mother being discharged home. She states, \"she is in too much pain and cannot get around the house on her own, I feel like she needs to go to a rehabilitation center from the hospital and not home\" will notify attending of conversation     Sammie Corado RN  01/17/18 7516    "

## 2018-01-18 NOTE — DISCHARGE PLACEMENT REQUEST
"Lorie Campos (89 y.o. Female)     Date of Birth Social Security Number Address Home Phone MRN    02/24/1927  [address] [address] [phone] [id number]    [employer] Marital Status          [employer]        Admission Date Admission Type Admitting Provider Attending Provider Department, Room/Bed    1/17/18 Emergency Monica Hernandez MD Loy, Elizabeth Diane, MD [address], P494/1    Discharge Date Discharge Disposition Discharge Destination                      Attending Provider: Monica Hernandez MD     Allergies:  Codeine    Isolation:  None   Infection:  None   Code Status:  FULL    Ht:  152.4 cm (60\")   Wt:  56.7 kg (125 lb)    Admission Cmt:  None   Principal Problem:  Immobility [Z74.09]                 Active Insurance as of 1/17/2018     Primary Coverage     Payor Plan Insurance Group Employer/Plan Group    MEDICARE MEDICARE A & B      Payor Plan Address Payor Plan Phone Number Effective From Effective To    [address] [phone] 2/1/1992     [place] [address]       Subscriber Name Subscriber Birth Date Member ID       LORIE CAMPOS 2/24/1927 [id number]           Secondary Coverage     Payor Plan Insurance Group Employer/Plan Group    AAR MED SUPP AAR HE[employer] CARE OPTIONS      Payor Plan Address Payor Plan Phone Number Effective From Effective To    [facility] [phone] 1/1/2018     [id number]       [place] [address]       Subscriber Name Subscriber Birth Date Member ID       LORIE CAMPOS 2/24/1927 [id number]                 Emergency Contacts      (Rel.) Home Phone Work Phone Mobile Phone    Judy Campos (Daughter) [phone] -- [phone]    Tobias Gray (Other) [phone] -- [phone]              "

## 2018-01-19 VITALS
TEMPERATURE: 98.3 F | SYSTOLIC BLOOD PRESSURE: 152 MMHG | HEIGHT: 60 IN | HEART RATE: 71 BPM | RESPIRATION RATE: 16 BRPM | OXYGEN SATURATION: 90 % | DIASTOLIC BLOOD PRESSURE: 78 MMHG | WEIGHT: 125 LBS | BODY MASS INDEX: 24.54 KG/M2

## 2018-01-19 PROCEDURE — 96372 THER/PROPH/DIAG INJ SC/IM: CPT

## 2018-01-19 PROCEDURE — 97110 THERAPEUTIC EXERCISES: CPT

## 2018-01-19 PROCEDURE — G0378 HOSPITAL OBSERVATION PER HR: HCPCS

## 2018-01-19 PROCEDURE — 25010000002 ENOXAPARIN PER 10 MG: Performed by: HOSPITALIST

## 2018-01-19 RX ORDER — TRAMADOL HYDROCHLORIDE 50 MG/1
50 TABLET ORAL EVERY 4 HOURS PRN
Qty: 20 TABLET | Refills: 0 | Status: SHIPPED | OUTPATIENT
Start: 2018-01-19 | End: 2018-01-28

## 2018-01-19 RX ADMIN — ENOXAPARIN SODIUM 40 MG: 40 INJECTION SUBCUTANEOUS at 06:57

## 2018-01-19 RX ADMIN — LISINOPRIL 10 MG: 10 TABLET ORAL at 09:51

## 2018-01-19 RX ADMIN — TRAMADOL HYDROCHLORIDE 50 MG: 50 TABLET, FILM COATED ORAL at 09:50

## 2018-01-19 RX ADMIN — TRAMADOL HYDROCHLORIDE 50 MG: 50 TABLET, FILM COATED ORAL at 14:25

## 2018-01-19 RX ADMIN — TRAMADOL HYDROCHLORIDE 50 MG: 50 TABLET, FILM COATED ORAL at 03:04

## 2018-01-19 RX ADMIN — TRAMADOL HYDROCHLORIDE 50 MG: 50 TABLET, FILM COATED ORAL at 18:58

## 2018-01-19 RX ADMIN — ESCITALOPRAM 10 MG: 10 TABLET, FILM COATED ORAL at 09:51

## 2018-01-19 RX ADMIN — AMLODIPINE BESYLATE 10 MG: 10 TABLET ORAL at 09:50

## 2018-01-19 NOTE — DISCHARGE SUMMARY
Date of Discharge:  1/19/2018  Date of Admit: 1/17/2018    Discharge Diagnosis:  Principal Problem:    Immobility  Active Problems:    Right rib fracture    Fall    Contusion of right knee    HTN (hypertension)    Scoliosis    Arthritis    Decreased hearing      Procedures Performed         Consults     Date and Time Order Name Status Description    1/18/2018 0134 [facility] (on-call MD unless specified) Completed             Hospital Course:   89 yo female who had fallen on 1/14/2018. She was seen at [facility] & found to have a fracture of the right posterior 12th rib. She was discharged home but was unable to do anything secondary to the rib pain. She presented to the ER here & was admitted as observation. Initially she was refusing any pain meds but I ordered tramadol which she did eventually take and tolerated well.  At this point she is stable for transfer to the SNU for continued recuperation and therapy.       Physical Exam:  WDWN female in NAD. Alert & oriented, but anxious. I also suspect she is [place] because I will ask her about something & she will give an answer that doesn't make any sense. I think this is because she didn't hear what I said  Lungs clear with equal BS bilaterally  Heart RRR, 2/6 NAKUL heard throughout precordium  Ext no edema.  Skin warm & dry      Discharge Medications   Vale Campos   Home Medication Instructions HUSSEIN:[id number]    Printed on:01/19/18 1822   Medication Information                      amLODIPine (NORVASC) 10 MG tablet  Take  by mouth Daily.             escitalopram (LEXAPRO) 10 MG tablet  Take  by mouth Daily.             lisinopril (PRINIVIL,ZESTRIL) 10 MG tablet  Take  by mouth Daily.             traMADol (ULTRAM) 50 MG tablet  Take 1 tablet by mouth Every 4 (Four) Hours As Needed for Moderate Pain  for up to 9 days.                   Activity at Discharge:     Follow-up Appointments  Follow-up Information     Follow up with Juanita Carvalho MD .    Specialty:   Internal Medicine    Contact information:    [address]  [place] [id number] [phone]          Follow up with [facility] .    Specialties:  Skilled Nursing Facility, Intermediate Care Facility    Contact information:    [address] Aguero  [facility] [address] [phone]            DISCHARGE DISPOSITION:     Monica Hernandez MD  01/19/18  6:22 PM

## 2018-01-19 NOTE — PLAN OF CARE
Problem: Patient Care Overview (Adult)  Goal: Plan of Care Review   01/19/18 4283   Coping/Psychosocial Response Interventions   Plan Of Care Reviewed With patient   Outcome Evaluation   Outcome Summary/Follow up Plan pt cont to be in significant pain with mvmt which is limiting her ability to get on her feet, she was able to sit edge of bed today and she made an attempt at standing, pt was cooperative and initiated more mvmt today, pt did c/o neck pain after being positioned supine in bed, nsg informed and came in to address with pt   Patient Care Overview   Progress progress toward functional goals is gradual

## 2018-01-19 NOTE — PROGRESS NOTES
[address] [phone]    Physicians Statement of Medical Necessity for Ambulance Transportation    It is medically necessary for:    Patient Name: Vale Campos    Insurance Information:  Medicare A & B #ID [id number] and AAR medicare supplement #ID [id number]    To be transported by ambulance: [facility] on 1/19/18 @ 8:30 PM    From (if nursing facility, specify level of care: skilled, snf, etc): [facility]    To (specify level of care if nursing facility): [facility], [address] [place] [address] (skilled bed)    Date of Service: 1/17/18-1/19/18    For dialysis patients state date dialysis began: N/A    Diagnosis: Immobility, rib fracture and weakness    Past Medical/Surgical History:  Past Medical History:   Diagnosis Date   • Arthritis    • Depression    • Hypertension    • Prolapse of uterus    • Scoliosis       Past Surgical History:   Procedure Laterality Date   • APPENDECTOMY     • CHOLECYSTECTOMY          Current Objective Medical Evidence(including physical exam finding to support reason for limitations):    Bedridden    Other:    Physician Signature:           (RN,NP,PA,CAN, Discharge Planner) Date/Time: 1/19/18 @ 8:30 PM     Printed Name:    __________________________________    [facility]   Phone: [phone] Phone: [phone] Phone: [phone]   Fax: [phone] Fax: [phone] Fax: [phone]

## 2018-01-19 NOTE — PLAN OF CARE
Problem: Patient Care Overview (Adult)  Goal: Plan of Care Review  Outcome: Ongoing (interventions implemented as appropriate)   01/18/18 1707 01/18/18 2129 01/19/18 0425   Coping/Psychosocial Response Interventions   Plan Of Care Reviewed With --  patient --    Outcome Evaluation   Outcome Summary/Follow up Plan --  --  Patient was medicated PRN for pain. Patient slept most of the night. No family that stayed all night. Patient is very hard of hearing. Possible discharge to a rehab facility today. Will continue to monitor vital signs, labs and comfort.   Patient Care Overview   Progress no change --  --      Goal: Adult Individualization and Mutuality  Outcome: Ongoing (interventions implemented as appropriate)    Goal: Discharge Needs Assessment  Outcome: Ongoing (interventions implemented as appropriate)      Problem: Fall Risk (Adult)  Goal: Absence of Falls  Outcome: Ongoing (interventions implemented as appropriate)      Problem: Pain, Acute (Adult)  Goal: Acceptable Pain Control/Comfort Level  Outcome: Ongoing (interventions implemented as appropriate)      Problem: Pressure Ulcer Risk (Parish Scale) (Adult,Obstetrics,Pediatric)  Goal: Skin Integrity  Outcome: Ongoing (interventions implemented as appropriate)

## 2018-01-19 NOTE — PROGRESS NOTES
Discharge Planning Assessment  [facility]     Patient Name: Vale Campos  MRN: [id number]  Today's Date: 1/19/2018    Admit Date: 1/17/2018          Discharge Needs Assessment     None            Discharge Plan       01/19/18 1200    Case Management/Social Work Plan    Additional Comments Spoke with the patient and she states that she has no one to take care of her at home until she can get stronger. Agreeable to rehab. medicaid pending. JAVIER Franco RN, CCP.         Discharge Placement     Facility/Agency Request Status Selected? Address Phone Number Fax Number    [facility] - Request Sent     [address] [facility] [address] [phone]    [facility] Pending - Request Sent     [address] [facility] [place] [id number] [phone]    [facility] Pending - Request Sent     [address] [facility] [address] [phone]    [facility] Pending - Request Sent     [address] [facility] [address] [phone]    [facility] Pending - Request Sent     [address] [facility] [address] [phone]    [facility] Pending - Request Sent     [address] [facility] [address] [phone]    [facility] Pending - Request Sent     [address] [facility] [address] [phone]    [facility] - [facility] Sent     [address] [facility] [address] [phone]    [facility] Declined   no beds available     [address] [facility] [address] [phone]    [facility] Declined   no beds available     [address] [facility] [address] [phone]                Demographic Summary     None            Functional Status     None            Psychosocial     None             Abuse/Neglect     None            Legal     None            Substance Abuse     None            Patient Forms     None          Ila Franco RN

## 2018-01-19 NOTE — PROGRESS NOTES
Discharge Planning Assessment  [facility]     Patient Name: Vale Campos  MRN: [id number]  Today's Date: 1/19/2018    Admit Date: 1/17/2018          Discharge Needs Assessment     None            Discharge Plan       01/19/18 1454    Case Management/Social Work Plan    Additional Comments Spoke with Danita Angela/Yoav Keenan and she states the patient is appropriate for the Waiver program and she spoke with the patient and Judy Campos and they are agreeable to the patient being transferred to [facility] with the waiver program with [employer]. Ambulance set up at 8:30 PM by [facility] to provide the transportation to [facility], [address], [place] [address]    Final Note    Final Note [facility] with Waiver program, by [facility] on 1/19/18. JAVIER Franco RN, CCP.        Discharge Placement     Facility/Agency Request Status Selected? Address Phone Number Fax Number    [facility] Accepted    Yes [address] [facility] [place] [address] [phone]    [facility] Pending - Request Sent     [address] [facility] [address] [phone]    [facility] Pending - Request Sent     [address] [facility] [place] [id number] [phone]    [facility] Pending - Request Sent     [address], [facility] [address] [phone]    [facility] Pending - Request Sent     [address] [facility] [address] [phone]    [facility] Pending - Request Sent     [address] [facility] [address] [phone]    [facility] Declined   no beds available     [address] [facility] [address] [phone]    [facility] Declined   no beds available     [address] [facility] [address] [phone]     [facility] Declined     [address] [facility] [address] [phone]    [facility] Declined     [address] [facility] [address] [phone]    [facility] Declined     [address] [facility] [facility] [address] [address] [phone]    [facility] Declined     [address] [facility] [address] [phone]        Expected Discharge Date and Time     Expected Discharge Date Expected Discharge Time    Jan 19, 2018               Demographic Summary     None            Functional Status     None            Psychosocial     None            Abuse/Neglect     None            Legal     None            Substance Abuse     None            Patient Forms     None          Ila Franco RN

## 2018-01-19 NOTE — THERAPY TREATMENT NOTE
Acute Care - Physical Therapy Treatment Note  [facility]     Patient Name: Vale Campos  : 1927  MRN: [id number]  Today's Date: 2018  Onset of Illness/Injury or Date of Surgery Date: 18  Date of Referral to PT: 18  Referring Physician: Kanwal    Admit Date: 2018    Visit Dx:    ICD-10-CM ICD-9-CM   1. Immobility Z74.09 799.89   2. Generalized weakness R53.1 780.79   3. Hypokalemia E87.6 276.8   4. Contusion of right knee, initial encounter S80.01XA 924.11   5. Impaired functional mobility and activity tolerance Z74.09 V49.89     Patient Active Problem List   Diagnosis   • Immobility   • Right rib fracture   • Fall   • Contusion of right knee   • HTN (hypertension)   • Scoliosis   • Arthritis   • Decreased hearing               Adult Rehabilitation Note       18 1420          Rehab Assessment/Intervention    Discipline physical therapist  -PC      Document Type therapy note (daily note)  -PC      Subjective Information complains of;pain  -PC      Patient Effort, Rehab Treatment adequate  -PC      Symptoms Noted During/After Treatment increased pain  -PC      Symptoms Noted Comment when assisting pt to position in bed at completion of treatment, pt c/o neck pain, nsg informed and came in to address this with pt  -PC      Recorded by [PC] Rand Cabrera, PT      Pain Assessment    Pain Assessment Laurent-Camejo FACES  -PC      Laurent-Camejo FACES Pain Rating 8  -PC      Pain Type Acute pain  -PC      Pain Location Rib cage  -PC      Pain Orientation Right  -PC      Pain Intervention(s) Medication (See MAR);Repositioned  -PC      Response to Interventions not tolerated  -PC      Recorded by [PC] Rand Cabrera, PT      Cognitive Assessment/Intervention    Current Cognitive/Communication Assessment impaired  -PC      Orientation Status oriented to;person;place  -PC      Follows Commands/Answers Questions able to follow single-step instructions;100% of the time;needs increased time  -PC       Personal Safety decreased insight to deficits  -PC      Personal Safety Interventions fall prevention program maintained  -PC      Recorded by [PC] Rand Cabrera PT      Bed Mobility, Assessment/Treatment    Bed Mob, Supine to Sit, [place] moderate assist (50% patient effort);2 person assist required  -PC      Bed Mob, Sit to Supine, [place] maximum assist (25% patient effort);2 person assist required  -PC      Bed Mobility, Comment pt initiated more mvmt today , appears to be in less pain with just occasional crying out  -PC      Recorded by [PC] Rand Cabrera PT      Transfer Assessment/Treatment    Transfers, Sit-Stand [place] moderate assist (50% patient effort);2 person assist required  -PC      Transfer, Comment pt unable to come to full stand due to pain, when pt assisted to supine and we were positioning pt in bed, I assisted pt in scooting her legs over, then assisted pt in moving her shoulders over by placing my hands behind both shoulders, after  this pt c/o neck pain.  Nsg came in to check pt and offered to get pt an ice pack  -PC      Recorded by [PC] Rand Cabrera PT      Motor Skills/Interventions    Additional Documentation Balance Skills Training (Group)  -PC      Recorded by [PC] Rand Cabrera PT      Balance Skills Training    Sitting-Level of Assistance Minimum assistance;x2  -PC      Sitting-Balance Support Feet supported;Left upper extremity supported;Right upper extremity supported  -PC      Sitting-Balance Activities Trunk control activities  -PC      Sitting # of Minutes 6  -PC      Standing-Level of Assistance Maximum assistance;x2  -PC      Recorded by [PC] Rand Cabrera PT      Positioning and Restraints    Pre-Treatment Position in bed  -PC      Post Treatment Position bed  -PC      In Bed supine;call light within reach;encouraged to call for assist;exit alarm on;with nsg  -PC      Recorded by [PC] Rand Cabrera PT        User Key  (r) = Recorded By, (t) =  Taken By, (c) = Cosigned By    Initials Name Effective Dates    PC Rand Cabrera, PT 12/01/15 -                 IP PT Goals       01/18/18 1415          Bed Mobility PT LTG    Bed Mobility PT LTG, Date Established 01/18/18  -PC      Bed Mobility PT LTG, Time to Achieve 1 wk  -PC      Bed Mobility PT LTG, [place] Level moderate assist (50% patient effort);2 person assist required  -PC      Transfer Training PT LTG    Transfer Training PT LTG, Date Established 01/18/18  -PC      Transfer Training PT LTG, Time to Achieve 1 wk  -PC      Transfer Training PT LTG, Activity Type sit to stand/stand to sit  -PC      Transfer Training PT LTG, [place] Level moderate assist (50% patient effort);2 person assist required  -PC      Gait Training PT LTG    Gait Training Goal PT LTG, Date Established 01/18/18  -PC      Gait Training Goal PT LTG, Time to Achieve 1 wk  -PC      Gait Training Goal PT LTG, [place] Level moderate assist (50% patient effort);2 person assist required  -PC      Gait Training Goal PT LTG, Assist Device walker, rolling  -PC      Gait Training Goal PT LTG, Distance to Achieve 10 ft  -PC        User Key  (r) = Recorded By, (t) = Taken By, (c) = Cosigned By    Initials Name Provider Type    PC Rand Cabrera, PT Physical Therapist          Physical Therapy Education     Title: PT OT SLP Therapies (Active)     Topic: Physical Therapy (Active)     Point: Mobility training (Active)    Learning Progress Summary    Learner Readiness Method Response Comment Documented by Status   Patient Acceptance E,D NR  PC 01/19/18 1429 Active    Acceptance E,D NR  PC 01/18/18 1415 Active               Point: Home exercise program (Active)    Learning Progress Summary    Learner Readiness Method Response Comment Documented by Status   Patient Acceptance E,D NR  PC 01/18/18 1415 Active               Point: Body mechanics (Active)    Learning Progress Summary    Learner Readiness Method Response Comment Documented by  Status   Patient Acceptance E,D NR  PC 01/19/18 1429 Active    Acceptance E,D NR  PC 01/18/18 1415 Active               Point: Precautions (Active)    Learning Progress Summary    Learner Readiness Method Response Comment Documented by Status   Patient Acceptance E,D NR  PC 01/19/18 1429 Active    Acceptance E,D NR  PC 01/18/18 1415 Active                      User Key     Initials Effective Dates Name Provider Type Discipline     12/01/15 -  Rand Cabrera, PT Physical Therapist PT                    PT Recommendation and Plan  Anticipated Discharge Disposition: skilled nursing facility  Planned Therapy Interventions: bed mobility training, gait training, home exercise program, transfer training, strengthening  PT Frequency: daily  Plan of Care Review  Plan Of Care Reviewed With: patient  Progress: progress toward functional goals is gradual  Outcome Summary/Follow up Plan: pt cont to be in significant pain with mvmt which is limiting her ability to get on her feet, she was able to sit edge of bed today and she made an attempt at standing, pt was cooperative and initiated more mvmt today, pt did c/o neck pain after being positioned supine in bed, nsg informed and came in to address with pt          Outcome Measures       01/19/18 1400 01/18/18 1400       How much help from another person do you currently need...    Turning from your back to your side while in flat bed without using bedrails? 2  -PC 2  -PC     Moving from lying on back to sitting on the side of a flat bed without bedrails? 2  -PC 2  -PC     Moving to and from a bed to a chair (including a wheelchair)? 1  -PC 1  -PC     Standing up from a chair using your arms (e.g., wheelchair, bedside chair)? 1  -PC 1  -PC     Climbing 3-5 steps with a railing? 1  -PC 1  -PC     To walk in hospital room? 1  -PC 1  -PC     AM-PAC 6 Clicks Score 8  -PC 8  -PC     Functional Assessment    Outcome Measure Options  AM-PAC 6 Clicks Basic Mobility (PT)  -PC       User  Key  (r) = Recorded By, (t) = Taken By, (c) = Cosigned By    Initials Name Provider Type    PC Rand Cabrera, PT Physical Therapist           Time Calculation:         PT Charges       01/19/18 1432          Time Calculation    Start Time 1349  -PC      Stop Time 1416  -PC      Time Calculation (min) 27 min  -PC      PT Received On 01/19/18  -PC      PT - Next Appointment 01/20/18  -PC        User Key  (r) = Recorded By, (t) = Taken By, (c) = Cosigned By    Initials Name Provider Type    PC Rand Cabrera, PT Physical Therapist          Therapy Charges for Today     Code Description Service Date Service Provider Modifiers Qty    45306780926 HC PT EVAL MOD COMPLEXITY 2 1/18/2018 Randosmin Cabrera, PT GP 1    00018888746 HC PT THER PROC EA 15 MIN 1/18/2018 Rand G Deborah, PT GP 1    62823291524 HC PT THER SUPP EA 15 MIN 1/18/2018 Rand G Deborah, PT GP 1    54483483654 HC PT THER PROC EA 15 MIN 1/19/2018 Rand G Deborah, PT GP 2    54318585949 HC PT THER SUPP EA 15 MIN 1/19/2018 Rand G Deborah, PT GP 2          PT G-Codes  Outcome Measure Options: AM-PAC 6 Clicks Basic Mobility (PT)    Rand Cabrera, PT  1/19/2018

## 2018-01-19 NOTE — PROGRESS NOTES
Case Management Discharge Note    Final Note: [facility], skilled bed with Waiver program, by [facility] on 1/19/18. JAVIER Franco RN, CCP.    Discharge Placement     Facility/Agency Request Status Selected? Address Phone Number Fax Number    [facility] Accept[facility]    [address] [facility] [place] [address] [phone]    [facility] RE[facility]AB Pending - Request Sent     [address], [facility] [address] [phone]    [facility] Pending - Request Sent     [address] [facility] [place] [address] [phone]    [facility] Pending - Request Sent     [address], [facility] [address] [phone]    [facility] Pending - Request Sent     [address], [facility] [address] [phone]    [facility] Pending - Request Sent     [address], [facility] [address] [phone]    [facility] Declined   no beds available     [address] [facility] [address] [phone]    [facility] Declined   no beds available     [address] [facility] [address] [phone]    [facility] RE[facility]AB Declined     [address] [facility] [address] [phone]    [facility] Declined     [address] [facility] [address] [phone]    [facility] Declined     [address], [facility] [address] [phone]    [facility] Declined     [address] [facility] [address] [phone]        [facility]ulance: [facility]    Discharge Codes: 03  Discharged/transferred to skilled nursing facility (SNF) with Medicare certification in anticipation of skilled care

## 2018-06-27 ENCOUNTER — INPATIENT HOSPITAL (OUTPATIENT)
Dept: URBAN - METROPOLITAN AREA HOSPITAL 90 | Facility: HOSPITAL | Age: 83
End: 2018-06-27
Payer: COMMERCIAL

## 2018-06-27 DIAGNOSIS — K92.2 GASTROINTESTINAL HEMORRHAGE, UNSPECIFIED: ICD-10-CM

## 2018-06-27 DIAGNOSIS — D50.0 IRON DEFICIENCY ANEMIA SECONDARY TO BLOOD LOSS (CHRONIC): ICD-10-CM

## 2018-06-27 PROCEDURE — 99222 1ST HOSP IP/OBS MODERATE 55: CPT

## 2018-06-28 ENCOUNTER — INPATIENT HOSPITAL (OUTPATIENT)
Dept: URBAN - METROPOLITAN AREA HOSPITAL 90 | Facility: HOSPITAL | Age: 83
End: 2018-06-28
Payer: COMMERCIAL

## 2018-06-28 DIAGNOSIS — K57.30 DIVERTICULOSIS OF LARGE INTESTINE WITHOUT PERFORATION OR ABS: ICD-10-CM

## 2018-06-28 DIAGNOSIS — K51.40 INFLAMMATORY POLYPS OF COLON WITHOUT COMPLICATIONS: ICD-10-CM

## 2018-06-28 DIAGNOSIS — D50.0 IRON DEFICIENCY ANEMIA SECONDARY TO BLOOD LOSS (CHRONIC): ICD-10-CM

## 2018-06-28 DIAGNOSIS — D12.3 BENIGN NEOPLASM OF TRANSVERSE COLON: ICD-10-CM

## 2018-06-28 DIAGNOSIS — K92.2 GASTROINTESTINAL HEMORRHAGE, UNSPECIFIED: ICD-10-CM

## 2018-06-28 PROCEDURE — 43235 EGD DIAGNOSTIC BRUSH WASH: CPT

## 2018-06-28 PROCEDURE — 45385 COLONOSCOPY W/LESION REMOVAL: CPT

## 2019-03-14 ENCOUNTER — TELEPHONE (OUTPATIENT)
Dept: OBSTETRICS AND GYNECOLOGY | Age: 84
End: 2019-03-14

## 2019-03-15 NOTE — TELEPHONE ENCOUNTER
Addie - please call patient and physician  Who referred her and let them know patient would be better served by a urogynecologist - please refer to Dr. Montanez

## 2020-06-19 ENCOUNTER — INPATIENT HOSPITAL (OUTPATIENT)
Dept: URBAN - METROPOLITAN AREA HOSPITAL 107 | Facility: HOSPITAL | Age: 85
End: 2020-06-19
Payer: COMMERCIAL

## 2020-06-19 DIAGNOSIS — K57.30 DIVERTICULOSIS OF LARGE INTESTINE WITHOUT PERFORATION OR ABS: ICD-10-CM

## 2020-06-19 DIAGNOSIS — K22.2 ESOPHAGEAL OBSTRUCTION: ICD-10-CM

## 2020-06-19 DIAGNOSIS — K64.1 SECOND DEGREE HEMORRHOIDS: ICD-10-CM

## 2020-06-19 DIAGNOSIS — K62.5 HEMORRHAGE OF ANUS AND RECTUM: ICD-10-CM

## 2020-06-19 DIAGNOSIS — R93.3 ABNORMAL FINDINGS ON DIAGNOSTIC IMAGING OF OTHER PARTS OF DI: ICD-10-CM

## 2020-06-19 DIAGNOSIS — D50.0 IRON DEFICIENCY ANEMIA SECONDARY TO BLOOD LOSS (CHRONIC): ICD-10-CM

## 2020-06-19 PROCEDURE — 99223 1ST HOSP IP/OBS HIGH 75: CPT | Performed by: INTERNAL MEDICINE

## 2020-06-20 PROCEDURE — 43235 EGD DIAGNOSTIC BRUSH WASH: CPT | Performed by: INTERNAL MEDICINE

## 2020-06-20 PROCEDURE — 45330 DIAGNOSTIC SIGMOIDOSCOPY: CPT | Performed by: INTERNAL MEDICINE

## 2020-06-21 PROCEDURE — 99233 SBSQ HOSP IP/OBS HIGH 50: CPT | Performed by: INTERNAL MEDICINE

## 2020-07-07 ENCOUNTER — INPATIENT HOSPITAL (OUTPATIENT)
Dept: URBAN - METROPOLITAN AREA HOSPITAL 107 | Facility: HOSPITAL | Age: 85
End: 2020-07-07
Payer: COMMERCIAL

## 2020-07-07 DIAGNOSIS — K57.31 DIVERTICULOSIS OF LARGE INTESTINE WITHOUT PERFORATION OR ABS: ICD-10-CM

## 2020-07-07 DIAGNOSIS — K92.1 MELENA: ICD-10-CM

## 2020-07-07 DIAGNOSIS — D50.0 IRON DEFICIENCY ANEMIA SECONDARY TO BLOOD LOSS (CHRONIC): ICD-10-CM

## 2020-07-07 PROCEDURE — 99223 1ST HOSP IP/OBS HIGH 75: CPT | Performed by: INTERNAL MEDICINE

## 2020-07-08 ENCOUNTER — INPATIENT HOSPITAL (OUTPATIENT)
Dept: URBAN - METROPOLITAN AREA HOSPITAL 107 | Facility: HOSPITAL | Age: 85
End: 2020-07-08
Payer: COMMERCIAL

## 2020-07-08 DIAGNOSIS — K92.1 MELENA: ICD-10-CM

## 2020-07-08 DIAGNOSIS — D50.9 IRON DEFICIENCY ANEMIA, UNSPECIFIED: ICD-10-CM

## 2020-07-08 DIAGNOSIS — K29.40 CHRONIC ATROPHIC GASTRITIS WITHOUT BLEEDING: ICD-10-CM

## 2020-07-08 DIAGNOSIS — K64.8 OTHER HEMORRHOIDS: ICD-10-CM

## 2020-07-08 DIAGNOSIS — K57.90 DIVERTICULOSIS OF INTESTINE, PART UNSPECIFIED, WITHOUT PERFO: ICD-10-CM

## 2020-07-08 DIAGNOSIS — K44.9 DIAPHRAGMATIC HERNIA WITHOUT OBSTRUCTION OR GANGRENE: ICD-10-CM

## 2020-07-08 PROCEDURE — 99232 SBSQ HOSP IP/OBS MODERATE 35: CPT | Performed by: PHYSICIAN ASSISTANT

## 2020-07-10 PROCEDURE — 99232 SBSQ HOSP IP/OBS MODERATE 35: CPT | Performed by: PHYSICIAN ASSISTANT

## 2020-07-18 ENCOUNTER — LAB REQUISITION (OUTPATIENT)
Dept: LAB | Facility: HOSPITAL | Age: 85
End: 2020-07-18

## 2020-07-18 DIAGNOSIS — Z00.00 ROUTINE GENERAL MEDICAL EXAMINATION AT A HEALTH CARE FACILITY: ICD-10-CM

## 2020-07-18 LAB
ANION GAP SERPL CALCULATED.3IONS-SCNC: 10.7 MMOL/L (ref 5–15)
BASOPHILS # BLD AUTO: 0.05 10*3/MM3 (ref 0–0.2)
BASOPHILS NFR BLD AUTO: 0.6 % (ref 0–1.5)
BUN SERPL-MCNC: 15 MG/DL (ref 8–23)
BUN/CREAT SERPL: 24.6 (ref 7–25)
CALCIUM SPEC-SCNC: 8.5 MG/DL (ref 8.2–9.6)
CHLORIDE SERPL-SCNC: 99 MMOL/L (ref 98–107)
CO2 SERPL-SCNC: 30.3 MMOL/L (ref 22–29)
CREAT SERPL-MCNC: 0.61 MG/DL (ref 0.57–1)
DEPRECATED RDW RBC AUTO: 58.1 FL (ref 37–54)
EOSINOPHIL # BLD AUTO: 0.16 10*3/MM3 (ref 0–0.4)
EOSINOPHIL NFR BLD AUTO: 1.8 % (ref 0.3–6.2)
ERYTHROCYTE [DISTWIDTH] IN BLOOD BY AUTOMATED COUNT: 17.8 % (ref 12.3–15.4)
GFR SERPL CREATININE-BSD FRML MDRD: 92 ML/MIN/1.73
GLUCOSE SERPL-MCNC: 98 MG/DL (ref 65–99)
HCT VFR BLD AUTO: 29.8 % (ref 34–46.6)
HGB BLD-MCNC: 9 G/DL (ref 12–15.9)
IMM GRANULOCYTES # BLD AUTO: 0.02 10*3/MM3 (ref 0–0.05)
IMM GRANULOCYTES NFR BLD AUTO: 0.2 % (ref 0–0.5)
LYMPHOCYTES # BLD AUTO: 0.87 10*3/MM3 (ref 0.7–3.1)
LYMPHOCYTES NFR BLD AUTO: 10 % (ref 19.6–45.3)
MCH RBC QN AUTO: 26.7 PG (ref 26.6–33)
MCHC RBC AUTO-ENTMCNC: 30.2 G/DL (ref 31.5–35.7)
MCV RBC AUTO: 88.4 FL (ref 79–97)
MONOCYTES # BLD AUTO: 0.97 10*3/MM3 (ref 0.1–0.9)
MONOCYTES NFR BLD AUTO: 11.2 % (ref 5–12)
NEUTROPHILS NFR BLD AUTO: 6.59 10*3/MM3 (ref 1.7–7)
NEUTROPHILS NFR BLD AUTO: 76.2 % (ref 42.7–76)
NRBC BLD AUTO-RTO: 0 /100 WBC (ref 0–0.2)
PLATELET # BLD AUTO: 278 10*3/MM3 (ref 140–450)
PMV BLD AUTO: 10.5 FL (ref 6–12)
POTASSIUM SERPL-SCNC: 3.9 MMOL/L (ref 3.5–5.2)
RBC # BLD AUTO: 3.37 10*6/MM3 (ref 3.77–5.28)
SODIUM SERPL-SCNC: 140 MMOL/L (ref 136–145)
WBC # BLD AUTO: 8.66 10*3/MM3 (ref 3.4–10.8)

## 2020-07-18 PROCEDURE — 80048 BASIC METABOLIC PNL TOTAL CA: CPT

## 2020-07-18 PROCEDURE — 85025 COMPLETE CBC W/AUTO DIFF WBC: CPT
